# Patient Record
Sex: FEMALE | Race: WHITE
[De-identification: names, ages, dates, MRNs, and addresses within clinical notes are randomized per-mention and may not be internally consistent; named-entity substitution may affect disease eponyms.]

---

## 2017-11-11 ENCOUNTER — HOSPITAL ENCOUNTER (EMERGENCY)
Dept: HOSPITAL 1 - ED | Age: 47
Discharge: HOME | End: 2017-11-11
Payer: COMMERCIAL

## 2017-11-11 VITALS — SYSTOLIC BLOOD PRESSURE: 118 MMHG | DIASTOLIC BLOOD PRESSURE: 79 MMHG

## 2017-11-11 DIAGNOSIS — G89.4: ICD-10-CM

## 2017-11-11 DIAGNOSIS — K59.00: ICD-10-CM

## 2017-11-11 DIAGNOSIS — N39.0: Primary | ICD-10-CM

## 2017-11-11 DIAGNOSIS — M77.02: ICD-10-CM

## 2017-11-11 LAB
ALBUMIN SERPL-MCNC: 3.5 G/DL (ref 3.4–5)
ALP SERPL-CCNC: 87 U/L (ref 46–116)
ALT SERPL-CCNC: 13 U/L (ref 14–59)
AMYLASE SERPL-CCNC: 64 U/L (ref 25–115)
AST SERPL-CCNC: 6 U/L (ref 15–37)
BASOPHILS NFR BLD: 0.4 % (ref 0–2)
BILIRUB SERPL-MCNC: 0.5 MG/DL (ref 0.2–1)
BUN SERPL-MCNC: 12 MG/DL (ref 7–18)
CALCIUM SERPL-MCNC: 8.8 MG/DL (ref 8.5–10.1)
CHLORIDE SERPL-SCNC: 104 MMOL/L (ref 98–107)
CO2 SERPL-SCNC: 29.2 MMOL/L (ref 21–32)
CREAT SERPL-MCNC: 0.8 MG/DL (ref 0.6–1)
ERYTHROCYTE [DISTWIDTH] IN BLOOD BY AUTOMATED COUNT: 13.4 % (ref 11.5–14.5)
GFR SERPLBLD BASED ON 1.73 SQ M-ARVRAT: > 60 ML/MIN
GLUCOSE SERPL-MCNC: 102 MG/DL (ref 74–106)
LIPASE SERPL-CCNC: 121 IU/L (ref 73–393)
MICROSCOPIC UR-IMP: YES
PLATELET # BLD: 171 X10^3MCL (ref 130–400)
POTASSIUM SERPL-SCNC: 3.8 MMOL/L (ref 3.5–5.1)
PROT SERPL-MCNC: 7.3 G/DL (ref 6.4–8.2)
RBC # UR STRIP.AUTO: (no result) /UL
SODIUM SERPL-SCNC: 138 MMOL/L (ref 136–145)
UA SPECIFIC GRAVITY: 1.02 (ref 1–1.03)

## 2018-01-01 ENCOUNTER — HOSPITAL ENCOUNTER (EMERGENCY)
Dept: HOSPITAL 1 - ED | Age: 48
Discharge: LEFT BEFORE DISCHARGE | End: 2018-01-01
Payer: COMMERCIAL

## 2018-01-01 VITALS — SYSTOLIC BLOOD PRESSURE: 147 MMHG | DIASTOLIC BLOOD PRESSURE: 99 MMHG

## 2018-01-01 VITALS
HEIGHT: 67 IN | HEIGHT: 67 IN | BODY MASS INDEX: 27.62 KG/M2 | WEIGHT: 176 LBS | WEIGHT: 176 LBS | BODY MASS INDEX: 27.62 KG/M2

## 2018-01-01 DIAGNOSIS — Z53.21: Primary | ICD-10-CM

## 2018-01-02 ENCOUNTER — HOSPITAL ENCOUNTER (EMERGENCY)
Dept: HOSPITAL 1 - ED | Age: 48
Discharge: HOME | End: 2018-01-02
Payer: COMMERCIAL

## 2018-01-02 VITALS — HEIGHT: 67 IN | WEIGHT: 173 LBS | BODY MASS INDEX: 27.15 KG/M2

## 2018-01-02 VITALS — DIASTOLIC BLOOD PRESSURE: 90 MMHG | SYSTOLIC BLOOD PRESSURE: 124 MMHG

## 2018-01-02 DIAGNOSIS — J11.1: Primary | ICD-10-CM

## 2018-01-02 LAB
BASOPHILS NFR BLD: 0.4 % (ref 0–2)
BUN SERPL-MCNC: 10 MG/DL (ref 7–18)
CALCIUM SERPL-MCNC: 8.7 MG/DL (ref 8.5–10.1)
CHLORIDE SERPL-SCNC: 102 MMOL/L (ref 98–107)
CO2 SERPL-SCNC: 27.3 MMOL/L (ref 21–32)
CREAT SERPL-MCNC: 0.9 MG/DL (ref 0.6–1)
ERYTHROCYTE [DISTWIDTH] IN BLOOD BY AUTOMATED COUNT: 13.9 % (ref 11.5–14.5)
GFR SERPLBLD BASED ON 1.73 SQ M-ARVRAT: > 60 ML/MIN
GLUCOSE SERPL-MCNC: 98 MG/DL (ref 74–106)
MICROSCOPIC UR-IMP: YES
PLATELET # BLD: 140 X10^3MCL (ref 130–400)
POTASSIUM SERPL-SCNC: 3.4 MMOL/L (ref 3.5–5.1)
RBC # UR STRIP.AUTO: (no result) /UL
SODIUM SERPL-SCNC: 138 MMOL/L (ref 136–145)
UA SPECIFIC GRAVITY: >=1.03 (ref 1–1.03)

## 2018-09-24 ENCOUNTER — HOSPITAL ENCOUNTER (EMERGENCY)
Dept: HOSPITAL 1 - ED | Age: 48
Discharge: HOME | End: 2018-09-24
Payer: COMMERCIAL

## 2018-09-24 VITALS
BODY MASS INDEX: 28.72 KG/M2 | HEIGHT: 67 IN | HEIGHT: 67 IN | BODY MASS INDEX: 28.72 KG/M2 | WEIGHT: 183 LBS | WEIGHT: 183 LBS

## 2018-09-24 VITALS — DIASTOLIC BLOOD PRESSURE: 70 MMHG | SYSTOLIC BLOOD PRESSURE: 110 MMHG

## 2018-09-24 DIAGNOSIS — F41.9: ICD-10-CM

## 2018-09-24 DIAGNOSIS — F32.9: ICD-10-CM

## 2018-09-24 DIAGNOSIS — Z86.2: ICD-10-CM

## 2018-09-24 DIAGNOSIS — N39.0: Primary | ICD-10-CM

## 2018-10-21 ENCOUNTER — HOSPITAL ENCOUNTER (EMERGENCY)
Dept: HOSPITAL 1 - ED | Age: 48
Discharge: HOME | End: 2018-10-21
Payer: COMMERCIAL

## 2018-10-21 VITALS — SYSTOLIC BLOOD PRESSURE: 119 MMHG | DIASTOLIC BLOOD PRESSURE: 79 MMHG

## 2018-10-21 VITALS — HEIGHT: 67 IN | WEIGHT: 181.5 LBS | BODY MASS INDEX: 28.49 KG/M2

## 2018-10-21 DIAGNOSIS — G89.29: ICD-10-CM

## 2018-10-21 DIAGNOSIS — Z98.890: ICD-10-CM

## 2018-10-21 DIAGNOSIS — F41.9: ICD-10-CM

## 2018-10-21 DIAGNOSIS — Z98.82: ICD-10-CM

## 2018-10-21 DIAGNOSIS — G43.909: Primary | ICD-10-CM

## 2018-10-21 DIAGNOSIS — F32.9: ICD-10-CM

## 2018-10-21 DIAGNOSIS — G62.9: ICD-10-CM

## 2018-10-21 DIAGNOSIS — M54.9: ICD-10-CM

## 2018-10-21 DIAGNOSIS — Z86.2: ICD-10-CM

## 2018-10-21 LAB
MICROSCOPIC UR-IMP: YES
RBC # UR STRIP.AUTO: (no result) /UL
UA SPECIFIC GRAVITY: 1.01 (ref 1–1.03)

## 2018-11-11 ENCOUNTER — HOSPITAL ENCOUNTER (EMERGENCY)
Dept: HOSPITAL 1 - ED | Age: 48
Discharge: HOME | End: 2018-11-11
Payer: COMMERCIAL

## 2018-11-11 VITALS — WEIGHT: 184 LBS | HEIGHT: 67 IN | BODY MASS INDEX: 28.88 KG/M2

## 2018-11-11 VITALS — SYSTOLIC BLOOD PRESSURE: 124 MMHG | DIASTOLIC BLOOD PRESSURE: 79 MMHG

## 2018-11-11 DIAGNOSIS — F32.9: ICD-10-CM

## 2018-11-11 DIAGNOSIS — N30.80: Primary | ICD-10-CM

## 2018-11-11 DIAGNOSIS — Z86.2: ICD-10-CM

## 2018-11-11 DIAGNOSIS — Z98.82: ICD-10-CM

## 2018-11-11 DIAGNOSIS — F41.9: ICD-10-CM

## 2018-11-11 DIAGNOSIS — Z98.890: ICD-10-CM

## 2019-03-01 ENCOUNTER — HOSPITAL ENCOUNTER (EMERGENCY)
Dept: HOSPITAL 1 - ED | Age: 49
Discharge: HOME | End: 2019-03-01
Payer: COMMERCIAL

## 2019-03-01 VITALS — HEIGHT: 67 IN | BODY MASS INDEX: 29.51 KG/M2 | WEIGHT: 188 LBS

## 2019-03-01 VITALS — DIASTOLIC BLOOD PRESSURE: 82 MMHG | SYSTOLIC BLOOD PRESSURE: 127 MMHG
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
## 2019-03-01 DIAGNOSIS — F41.9: ICD-10-CM

## 2019-03-01 DIAGNOSIS — F32.9: ICD-10-CM

## 2019-03-01 DIAGNOSIS — Z86.2: ICD-10-CM
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
## 2019-03-01 DIAGNOSIS — S16.1XXA: Primary | ICD-10-CM

## 2019-03-01 DIAGNOSIS — R07.89: ICD-10-CM

## 2019-03-01 LAB
ALBUMIN SERPL-MCNC: 3.5 G/DL (ref 3.4–5)
ALP SERPL-CCNC: 78 U/L (ref 46–116)
ALT SERPL-CCNC: 17 U/L (ref 14–59)
AST SERPL-CCNC: 8 U/L (ref 15–37)
BASOPHILS NFR BLD: 0.6 % (ref 0–2)
BILIRUB SERPL-MCNC: 0.4 MG/DL (ref 0.2–1)
BUN SERPL-MCNC: 13 MG/DL (ref 7–18)
CALCIUM SERPL-MCNC: 9 MG/DL (ref 8.5–10.1)
CHLORIDE SERPL-SCNC: 104 MMOL/L (ref 98–107)
CO2 SERPL-SCNC: 26.2 MMOL/L (ref 21–32)
CREAT SERPL-MCNC: 0.8 MG/DL (ref 0.6–1)
ERYTHROCYTE [DISTWIDTH] IN BLOOD BY AUTOMATED COUNT: 13.9 % (ref 11.5–14.5)
GFR SERPLBLD BASED ON 1.73 SQ M-ARVRAT: > 60 ML/MIN
GLUCOSE SERPL-MCNC: 83 MG/DL (ref 74–106)
PLATELET # BLD: 193 X10^3MCL (ref 130–400)
POTASSIUM SERPL-SCNC: 4.2 MMOL/L (ref 3.5–5.1)
PROT SERPL-MCNC: 7.3 G/DL (ref 6.4–8.2)
SODIUM SERPL-SCNC: 138 MMOL/L (ref 136–145)

## 2019-12-31 ENCOUNTER — HOSPITAL ENCOUNTER (EMERGENCY)
Dept: HOSPITAL 1 - ED | Age: 49
Discharge: LEFT BEFORE DISCHARGE | End: 2019-12-31
Payer: COMMERCIAL

## 2019-12-31 DIAGNOSIS — Z53.21: Primary | ICD-10-CM

## 2020-01-18 ENCOUNTER — HOSPITAL ENCOUNTER (INPATIENT)
Dept: HOSPITAL 1 - ED | Age: 50
LOS: 1 days | Discharge: HOME | DRG: 249 | End: 2020-01-19
Attending: INTERNAL MEDICINE | Admitting: INTERNAL MEDICINE
Payer: COMMERCIAL

## 2020-01-18 VITALS
BODY MASS INDEX: 28.46 KG/M2 | HEIGHT: 67 IN | WEIGHT: 181.31 LBS | WEIGHT: 181.31 LBS | BODY MASS INDEX: 28.46 KG/M2 | HEIGHT: 67 IN

## 2020-01-18 VITALS — DIASTOLIC BLOOD PRESSURE: 68 MMHG | SYSTOLIC BLOOD PRESSURE: 101 MMHG

## 2020-01-18 VITALS — DIASTOLIC BLOOD PRESSURE: 73 MMHG | SYSTOLIC BLOOD PRESSURE: 116 MMHG

## 2020-01-18 DIAGNOSIS — E86.0: ICD-10-CM

## 2020-01-18 DIAGNOSIS — F32.9: ICD-10-CM

## 2020-01-18 DIAGNOSIS — D72.829: ICD-10-CM

## 2020-01-18 DIAGNOSIS — K52.9: Primary | ICD-10-CM

## 2020-01-18 DIAGNOSIS — G43.909: ICD-10-CM

## 2020-01-18 DIAGNOSIS — F41.9: ICD-10-CM

## 2020-01-18 LAB
ALBUMIN SERPL-MCNC: 4.1 G/DL (ref 3.4–5)
ALP SERPL-CCNC: 103 U/L (ref 46–116)
ALT SERPL-CCNC: 16 U/L (ref 14–59)
AMPHETAMINES UR QL SCN: NEGATIVE
AST SERPL-CCNC: 6 U/L (ref 15–37)
BASOPHILS NFR BLD: 0.1 % (ref 0–2)
BILIRUB SERPL-MCNC: 0.82 MG/DL (ref 0.2–1)
BUN SERPL-MCNC: 17 MG/DL (ref 7–18)
CALCIUM SERPL-MCNC: 9.2 MG/DL (ref 8.5–10.1)
CHLORIDE SERPL-SCNC: 102 MMOL/L (ref 98–107)
CO2 SERPL-SCNC: 28 MMOL/L (ref 21–32)
CREAT SERPL-MCNC: 1 MG/DL (ref 0.6–1)
ERYTHROCYTE [DISTWIDTH] IN BLOOD BY AUTOMATED COUNT: 13.8 % (ref 11.5–14.5)
GFR SERPLBLD BASED ON 1.73 SQ M-ARVRAT: > 60 ML/MIN
GLUCOSE SERPL-MCNC: 118 MG/DL (ref 74–106)
LIPASE SERPL-CCNC: 93 IU/L (ref 73–393)
PLATELET # BLD: 190 X10^3MCL (ref 130–400)
POTASSIUM SERPL-SCNC: 3.8 MMOL/L (ref 3.5–5.1)
PROT SERPL-MCNC: 8.2 G/DL (ref 6.4–8.2)
SODIUM SERPL-SCNC: 138 MMOL/L (ref 136–145)

## 2020-01-18 PROCEDURE — G0378 HOSPITAL OBSERVATION PER HR: HCPCS

## 2020-01-18 NOTE — NUR
PT HERE FOR C/O HAVING ABD PAIN AND FULL BODY PAIN THAT WORSENED THIS MORNING.
SIG OTHER AT BEDSIDE. PT TEARFUL

## 2020-01-18 NOTE — NUR
RECEIVED PT FROM ER, PT ADMIT FOR GASTROENTERITIS, DEHYDRATION. PT IS A/O X4,
VERBAL RESPONSIVE. C/O HEADACHE 10/10, LUNG SOUND CLEAR BILATERAL, NO COUGH,
NO SOB. PO2 95% IN ROOM AIR, PT IS ON TELE 2. STA, DENY ANY CHEST PAIN OR
DISCOMFORT, BOWEL SOUND PRESENT ALL 4 QUADRANTS, NO DISTENTION, NO TENDER.
C/O DIARRHEA YESTERDAY 4 TIMES, BUT STOP TODAY. PEDAL PULSE PRESENT BOTH FEET,
NO EDEMA, IV AT RIGHT AC, NO LEAKING, NO INFILTRATION. ALL ADLS ASSIST, ALL
NEED MET, CALL LIGHT IN REACH, WILL CONTINUE TO MONITOR.

## 2020-01-18 NOTE — NUR
PT ATTEMPTED TO HAVE BM IN RESTROOM BUT WAS UNSUCCESSFUL. PT VOMITED UPON
ARRIVING TO . TYLENOL HELD AT THIS TIME

## 2020-01-18 NOTE — NUR
Pt. received from day shift RN, currently resting in bed. Pt. is a/o x3,a ble
to make needs known, able to follow commands, has c/o of h/a. Pt. states she
was given tylenol downstairs before coming up, and nothing has helped with the
pain. Educated pt. that if she wants something to help it calm down, to call
using the call light button. Pt. states that she will rest for now to see if
that will help with her h/a. Will continue to monitor pt. otherwise, pt. at
this time has no c/o of chest pain, only discomfort is the h/a, no s/o SOB.
Pt. safety in check w/ call light placed within reach, pt. educated on when
and how to use call light system, bed set at lowest position, will continue to
monitor pt.

## 2020-01-19 VITALS — SYSTOLIC BLOOD PRESSURE: 118 MMHG | DIASTOLIC BLOOD PRESSURE: 83 MMHG

## 2020-01-19 VITALS — DIASTOLIC BLOOD PRESSURE: 95 MMHG | SYSTOLIC BLOOD PRESSURE: 134 MMHG

## 2020-01-19 VITALS — DIASTOLIC BLOOD PRESSURE: 72 MMHG | SYSTOLIC BLOOD PRESSURE: 111 MMHG

## 2020-01-19 NOTE — NUR
Pt. asleep throughout most of the night, easily arousable using verbal
stimuli. Pt. did c/o uncontrolled migraine, but states that her medication
usually helps her and that her  will be called to send pictures of that
medication so that it can added to her med rec. Pt. also states that she has
her night meds that helps her go to sleep and that her  will bring a
list of her meds in the AM. Otherwise, pt. stable, no c/o of chest pain, n/v,
s/o SOB or discomfort. Will continue to monitor pt. and endorse to next shift
RN.

## 2020-01-19 NOTE — NUR
D/C HOME INSTRUCTIONS GIVEN TO PT. WITH VERBALIZATION OF UNDERSTANDING. IV H/L
TO R AC REMOVED. PRESCRIPTION GIVEN. TELE. MONITOR #2 REMOVED AND RETURNED TO
TELE.  MONITOR STATION.

## 2020-01-19 NOTE — NUR
RECEIVED PT. IN BED A/A/O X3. NO SOB, NO N/V NOTED. PT. C/O HAVING ON AND OFF
MIGRAINE HEADACHE. PT. STATED SHE DID NOT HAVE ANY DIARRHEA FOR MORE THAN 24
HOURS. D5NS RUNNING  CC/HR VIA IV SITE AT R AC. BED IN LOW POS., CALL
LIGHT WITHIN REACH. SIDE RAILS UP X3.

## 2020-01-19 NOTE — NUR
Pt. states that the migraine has still continued throughout the night. Pt.
states that she usually takes a medication that she injects herself when
migraines get bad in between her monthly visits to her Neurologist. Pt. states
that her  will bring her nightly medication regiment because it is what
helps her sleep at night. Pt. states that he will bring it in the morning.
Pt. also states that she will tell her  to bring her migraine
medication in order to help her with the migraines. Offered pt. medication to
help her sleep, pt. willing to take ambien. Also offered pt. what is ordered
Norco and Morphine to help with the pain, pt. refused at this time. Otherwise,
pt. a/o x3, able to make needs known, able to follow commands, no c/o SOB or
s/o distress. Pt. noted to be asleep through most of the shift, easily
arousable using verbal stimuli. Will continue to monitor.

## 2020-01-19 NOTE — NUR
PT. IS BEING DISCHARGED IN STABLE CONDITION VIA WHEELCHAIR. ACCOMPANIED BY HER
 UPON DISCHARGE. ALL BELONGINGS SENT HOME WITH PT. AT THE TIME OF
DISCHARGE.

## 2020-01-20 ENCOUNTER — HOSPITAL ENCOUNTER (EMERGENCY)
Dept: HOSPITAL 1 - ED | Age: 50
Discharge: HOME | End: 2020-01-20
Payer: COMMERCIAL

## 2020-01-20 VITALS — HEIGHT: 67 IN | WEIGHT: 179 LBS | BODY MASS INDEX: 28.09 KG/M2

## 2020-01-20 VITALS — SYSTOLIC BLOOD PRESSURE: 124 MMHG | DIASTOLIC BLOOD PRESSURE: 83 MMHG

## 2020-01-20 DIAGNOSIS — Z86.2: ICD-10-CM

## 2020-01-20 DIAGNOSIS — R51: Primary | ICD-10-CM

## 2020-01-20 DIAGNOSIS — Z98.890: ICD-10-CM
